# Patient Record
Sex: FEMALE | Race: WHITE | NOT HISPANIC OR LATINO | URBAN - METROPOLITAN AREA
[De-identification: names, ages, dates, MRNs, and addresses within clinical notes are randomized per-mention and may not be internally consistent; named-entity substitution may affect disease eponyms.]

---

## 2020-06-01 ENCOUNTER — OFFICE VISIT (OUTPATIENT)
Dept: URBAN - METROPOLITAN AREA LAB 3 | Facility: LAB | Age: 55
End: 2020-06-01
Payer: COMMERCIAL

## 2020-06-01 DIAGNOSIS — K22.8 COLUMNAR-LINED ESOPHAGUS: ICD-10-CM

## 2020-06-01 DIAGNOSIS — K31.7 BENIGN GASTRIC POLYP: ICD-10-CM

## 2020-06-01 DIAGNOSIS — K29.30 CHRONIC SUPERFICIAL GASTRITIS: ICD-10-CM

## 2020-06-01 PROCEDURE — 43239 EGD BIOPSY SINGLE/MULTIPLE: CPT | Performed by: INTERNAL MEDICINE

## 2020-06-04 ENCOUNTER — LAB OUTSIDE AN ENCOUNTER (OUTPATIENT)
Dept: URBAN - METROPOLITAN AREA CLINIC 22 | Facility: CLINIC | Age: 55
End: 2020-06-04

## 2020-06-04 ENCOUNTER — TELEPHONE ENCOUNTER (OUTPATIENT)
Dept: URBAN - METROPOLITAN AREA CLINIC 22 | Facility: CLINIC | Age: 55
End: 2020-06-04

## 2020-06-26 ENCOUNTER — OFFICE VISIT (OUTPATIENT)
Dept: URBAN - METROPOLITAN AREA TELEHEALTH 2 | Facility: TELEHEALTH | Age: 55
End: 2020-06-26

## 2020-06-26 RX ORDER — NICOTINE POLACRILEX 2 MG
GUM BUCCAL
Qty: 0 | Refills: 0 | COMMUNITY
Start: 1900-01-01

## 2020-06-26 RX ORDER — IBUPROFEN AND FAMOTIDINE 800; 26.6 MG/1; MG/1
TAKE 1 TABLET BY ORAL ROUTE 3 TIMES PER DAY TABLET, COATED ORAL
Qty: 0 | Refills: 0 | COMMUNITY
Start: 1900-01-01

## 2020-06-26 RX ORDER — MAGNESIUM 30 MG
TABLET ORAL
Qty: 0 | Refills: 0 | COMMUNITY
Start: 1900-01-01

## 2020-06-26 RX ORDER — TURMERIC 400 MG
CAPSULE ORAL
Qty: 0 | Refills: 0 | COMMUNITY
Start: 1900-01-01

## 2020-06-26 RX ORDER — METOPROLOL TARTRATE 50 MG/1
TAKE 1 TABLET (50 MG) BY ORAL ROUTE 2 TIMES PER DAY WITH MEALS TABLET, FILM COATED ORAL 2
Qty: 0 | Refills: 0 | COMMUNITY
Start: 1900-01-01

## 2020-06-26 RX ORDER — FAMOTIDINE 40 MG/1
TAKE 1 TABLET BY ORAL ROUTE ONCE DAILY AT BEDTIME TABLET ORAL 1
Qty: 90 | Refills: 3 | COMMUNITY
Start: 2020-05-22 | End: 2021-05-17

## 2020-06-26 RX ORDER — CETIRIZINE HYDROCHLORIDE 10 MG/1
CAPSULE, LIQUID FILLED ORAL
Qty: 0 | Refills: 0 | COMMUNITY
Start: 1900-01-01

## 2020-06-26 RX ORDER — ONDANSETRON 4 MG/1
PLACE 1 TABLET ON TOP OF THE TONGUE WHERE IT WILL DISSOLVE, THEN SWALLOW, AS NEEDED EVERY 4 HOURS FOR NAUSEA/VOMITING TABLET, ORALLY DISINTEGRATING ORAL
Qty: 60 | Refills: 5 | COMMUNITY
Start: 2020-05-26

## 2020-06-26 RX ORDER — ZOLPIDEM TARTRATE 5 MG/1
TAKE 1 TABLET (5 MG) BY ORAL ROUTE ONCE DAILY AT BEDTIME TABLET, FILM COATED ORAL 1
Qty: 0 | Refills: 0 | COMMUNITY
Start: 1900-01-01

## 2020-06-26 RX ORDER — ASCORBIC ACID 500 MG
TABLET ORAL
Qty: 0 | Refills: 0 | COMMUNITY
Start: 1900-01-01

## 2020-06-26 RX ORDER — GLUCOSAMINE HCL/CHONDROITIN SU 500-400 MG
CAPSULE ORAL
Qty: 0 | Refills: 0 | COMMUNITY
Start: 1900-01-01

## 2020-06-26 RX ORDER — PANTOPRAZOLE SODIUM 40 MG/1
TAKE 1 TABLET (40 MG) BY ORAL ROUTE 2 TIMES PER DAY, 30 MINUTES BEFORE BREAKFAST AND DINNER TABLET, DELAYED RELEASE ORAL 1
Qty: 180 | Refills: 3 | COMMUNITY
Start: 2020-05-22 | End: 2021-05-17

## 2020-06-29 ENCOUNTER — TELEPHONE ENCOUNTER (OUTPATIENT)
Dept: URBAN - METROPOLITAN AREA CLINIC 78 | Facility: CLINIC | Age: 55
End: 2020-06-29

## 2020-07-10 ENCOUNTER — OFFICE VISIT (OUTPATIENT)
Dept: URBAN - METROPOLITAN AREA TELEHEALTH 2 | Facility: TELEHEALTH | Age: 55
End: 2020-07-10
Payer: COMMERCIAL

## 2020-07-10 DIAGNOSIS — Z80.0 FAMILY HISTORY OF COLON CANCER: ICD-10-CM

## 2020-07-10 DIAGNOSIS — K44.9 HIATAL HERNIA: ICD-10-CM

## 2020-07-10 DIAGNOSIS — K22.70 BARRETT'S ESOPHAGUS: ICD-10-CM

## 2020-07-10 DIAGNOSIS — K21.0 GASTROESOPHAGEAL REFLUX DISEASE (GERD): ICD-10-CM

## 2020-07-10 DIAGNOSIS — Z86.010 PERSONAL HISTORY OF COLON POLYPS: ICD-10-CM

## 2020-07-10 PROCEDURE — 1036F TOBACCO NON-USER: CPT | Performed by: INTERNAL MEDICINE

## 2020-07-10 PROCEDURE — G8427 DOCREV CUR MEDS BY ELIG CLIN: HCPCS | Performed by: INTERNAL MEDICINE

## 2020-07-10 PROCEDURE — 3017F COLORECTAL CA SCREEN DOC REV: CPT | Performed by: INTERNAL MEDICINE

## 2020-07-10 PROCEDURE — G9903 PT SCRN TBCO ID AS NON USER: HCPCS | Performed by: INTERNAL MEDICINE

## 2020-07-10 PROCEDURE — 99214 OFFICE O/P EST MOD 30 MIN: CPT | Performed by: INTERNAL MEDICINE

## 2020-07-10 PROCEDURE — G8417 CALC BMI ABV UP PARAM F/U: HCPCS | Performed by: INTERNAL MEDICINE

## 2020-07-10 RX ORDER — PANTOPRAZOLE SODIUM 40 MG/1
TAKE 1 TABLET (40 MG) BY ORAL ROUTE 2 TIMES PER DAY, 30 MINUTES BEFORE BREAKFAST AND DINNER TABLET, DELAYED RELEASE ORAL 1
OUTPATIENT
Start: 2020-05-22 | End: 2021-05-17

## 2020-07-10 RX ORDER — PANTOPRAZOLE SODIUM 20 MG/1
1 TABLET, 30 MINUTES BEFORE BREAKFAST TABLET, DELAYED RELEASE ORAL ONCE A DAY
Qty: 90 | Refills: 3 | OUTPATIENT
Start: 2020-07-10

## 2020-07-10 RX ORDER — PANTOPRAZOLE SODIUM 40 MG/1
TAKE 1 TABLET (40 MG) BY ORAL ROUTE 2 TIMES PER DAY, 30 MINUTES BEFORE BREAKFAST AND DINNER TABLET, DELAYED RELEASE ORAL 1
Qty: 180 | Refills: 3 | Status: ACTIVE | COMMUNITY
Start: 2020-05-22 | End: 2021-05-17

## 2020-07-10 RX ORDER — GLUCOSAMINE HCL/CHONDROITIN SU 500-400 MG
CAPSULE ORAL
Qty: 0 | Refills: 0 | Status: ACTIVE | COMMUNITY
Start: 1900-01-01

## 2020-07-10 RX ORDER — FAMOTIDINE 40 MG/1
TAKE 1 TABLET BY ORAL ROUTE ONCE DAILY AT BEDTIME TABLET ORAL 1
Qty: 90 | Refills: 3 | Status: ACTIVE | COMMUNITY
Start: 2020-05-22 | End: 2021-05-17

## 2020-07-10 RX ORDER — IBUPROFEN AND FAMOTIDINE 800; 26.6 MG/1; MG/1
TAKE 1 TABLET BY ORAL ROUTE 3 TIMES PER DAY TABLET, COATED ORAL
Qty: 0 | Refills: 0 | Status: ACTIVE | COMMUNITY
Start: 1900-01-01

## 2020-07-10 RX ORDER — CETIRIZINE HYDROCHLORIDE 10 MG/1
CAPSULE, LIQUID FILLED ORAL
Qty: 0 | Refills: 0 | Status: ACTIVE | COMMUNITY
Start: 1900-01-01

## 2020-07-10 RX ORDER — METOPROLOL TARTRATE 50 MG/1
TAKE 1 TABLET (50 MG) BY ORAL ROUTE 2 TIMES PER DAY WITH MEALS TABLET, FILM COATED ORAL 2
Qty: 0 | Refills: 0 | Status: ACTIVE | COMMUNITY
Start: 1900-01-01

## 2020-07-10 RX ORDER — ZOLPIDEM TARTRATE 5 MG/1
TAKE 1 TABLET (5 MG) BY ORAL ROUTE ONCE DAILY AT BEDTIME TABLET, FILM COATED ORAL 1
Qty: 0 | Refills: 0 | Status: ACTIVE | COMMUNITY
Start: 1900-01-01

## 2020-07-10 RX ORDER — ONDANSETRON 4 MG/1
PLACE 1 TABLET ON TOP OF THE TONGUE WHERE IT WILL DISSOLVE, THEN SWALLOW, AS NEEDED EVERY 4 HOURS FOR NAUSEA/VOMITING TABLET, ORALLY DISINTEGRATING ORAL
Qty: 60 | Refills: 5 | Status: ACTIVE | COMMUNITY
Start: 2020-05-26

## 2020-07-10 RX ORDER — MAGNESIUM 30 MG
TABLET ORAL
Qty: 0 | Refills: 0 | Status: ACTIVE | COMMUNITY
Start: 1900-01-01

## 2020-07-10 RX ORDER — ASCORBIC ACID 500 MG
TABLET ORAL
Qty: 0 | Refills: 0 | Status: ACTIVE | COMMUNITY
Start: 1900-01-01

## 2020-07-10 RX ORDER — TURMERIC 400 MG
CAPSULE ORAL
Qty: 0 | Refills: 0 | Status: ACTIVE | COMMUNITY
Start: 1900-01-01

## 2020-07-10 RX ORDER — NICOTINE POLACRILEX 2 MG
GUM BUCCAL
Qty: 0 | Refills: 0 | Status: ACTIVE | COMMUNITY
Start: 1900-01-01

## 2020-07-10 RX ORDER — FAMOTIDINE 40 MG/1
1 TABLET AT BEDTIME TABLET ORAL ONCE A DAY
Qty: 90 TABLET | Refills: 3
Start: 2020-05-22

## 2020-07-10 NOTE — HPI-OTHER HISTORIES
Previous visit 19:    - 53 yo  female who returns for follow-up - EGD on 19 was notable for possible short segment Finch's esophagus (irregular Z line with intestinal metaplasia), a 3 cm hiatal hernia, and mild gastritis, H. pylori negative.  The esophagus was normal but random esophageal biopsies were suggestive of mild acid reflux changes.  Random gastric and duodenal biopsies were negative.  Patient did have a small submucosal nodule in the stomach, for which further evaluation with EUS is planned in the near future. - Since her EGD, the patient had increased her pantoprazole to 40 mg qAM but she is still experiencing acid reflux symptoms at night - States she recently had a negative stress test with her cardiologist  Previous visit 19:  - 52 yo  female who returns for follow-up - Abdominopelvic CT done on 19 was negative for diverticulitis.  It was notable for mild distal esophageal wall thickening, small non-obstructing left renal calculi, and uterine fibroids - Still with same GI symptoms.  She does note that she is under a lot of stress, mostly related to a daughter with metal illness.  She is scheduled for an EGD with me next month. - Has been experiencing intermittent rectal bleeding for the past 2 weeks.  Has constipation alternating with diarrhea  Previous visit 19:  - 52 yo  female, referred by Dr. Randolph for further evaluation of GI complaints as detailed below, which have been ongoing for approximately 1 week - Epigastric abdominal pain with radiation to her back.  Pain varies between dull and sharp.  She also has lower abdominal cramping which is worse with bowel movements. - States she has not had a bowel movement for a few days.  Before then she would have 2-3 hard bowel movements per day.  She is able to pass gas. - Additional associated symptoms have included nausea, vomiting, abdominal bloating, and intermittent heartburn symptoms.  Denies fevers. - States she saw her gynecologist yesterday and that they were trying to get a cervical biopsy but that the cervix would not relax because she is post-menopausal, so they could not get it done - States she had another attack of pain last night so she went to an urgent care center.  States she had bloodwork (does not know results) and she was discharged home.  She was told to take Duexis (ibuprofen and famotidine) for her pain. - States she has a h/o colon polyps in  and that she subsequently had a negative colonoscopy with Dr. Mccoy in .  States a repeat colonoscopy in  was recommended. - Father  of colon cancer at age 80

## 2020-07-10 NOTE — EXAM-PHYSICAL EXAM
VIRTUAL PHYSICAL EXAM    Comprehensive gastrointestinal virtual examination:  I asked the patient to "be my hands".  I guided the patient to palpate and apply pressure at all four abdominal quadrants.  In response to these maneuvers, patient reported the following:  - Negative response to abdominal pain - Abdomen is soft, non-tender, and non-distended - No palpable abdominal masses - No palpable organs in the upper abdomen (no hepatomegaly or splenomegaly)   Constitutional - Ability to communicate:  Normal communication ability   Chest - Respiratory effort:  Breathing sounds unlabored   Cardiovascular - Palpation - Denies chest pain with palpation   Neurologic - Orientation:  Oriented to person, place, and time   Psychiatric:  Normal mood

## 2020-07-10 NOTE — HPI-TODAY'S VISIT:
- She is now doing well on pantoprazole 40 mg bid and famotidine 40 mg qhs  - EGD on 6/1/2020 was again suggestive of short segment Finch's esophagus without dysplasia (irregular Z-line with intestinal metaplasia) and a medium-sized hiatal hernia.  Random esophageal and gastric biopsies were benign and negative for any significant pathology. - EUS in 11/2019 revealed that her previously seen gastric submucosal lesions was a small submucosal gastric lipoma - Patient is scheduled for colonoscopy with me next month, for surveillance of colon polyps

## 2020-07-29 ENCOUNTER — DASHBOARD ENCOUNTERS (OUTPATIENT)
Age: 55
End: 2020-07-29

## 2020-07-30 ENCOUNTER — OFFICE VISIT (OUTPATIENT)
Dept: URBAN - METROPOLITAN AREA SURGERY CENTER 14 | Facility: SURGERY CENTER | Age: 55
End: 2020-07-30

## 2020-07-30 ENCOUNTER — OFFICE VISIT (OUTPATIENT)
Dept: URBAN - METROPOLITAN AREA SURGERY CENTER 14 | Facility: SURGERY CENTER | Age: 55
End: 2020-07-30
Payer: COMMERCIAL

## 2020-07-30 DIAGNOSIS — Z86.010 H/O ADENOMATOUS POLYP OF COLON: ICD-10-CM

## 2020-07-30 DIAGNOSIS — D12.0 BENIGN NEOPLASM OF CECUM: ICD-10-CM

## 2020-07-30 DIAGNOSIS — Z80.0 FAMILY HISTORY MALIGNANT NEOPLASM OF BILIARY TRACT: ICD-10-CM

## 2020-07-30 PROCEDURE — 45385 COLONOSCOPY W/LESION REMOVAL: CPT | Performed by: INTERNAL MEDICINE

## 2020-07-30 PROCEDURE — G8907 PT DOC NO EVENTS ON DISCHARG: HCPCS | Performed by: INTERNAL MEDICINE

## 2020-07-30 PROCEDURE — G9936 PMH PLYP/NEO CO/RECT/JUN/ANS: HCPCS | Performed by: INTERNAL MEDICINE

## 2020-07-30 RX ORDER — NICOTINE POLACRILEX 2 MG
GUM BUCCAL
Qty: 0 | Refills: 0 | Status: ACTIVE | COMMUNITY
Start: 1900-01-01

## 2020-07-30 RX ORDER — PANTOPRAZOLE SODIUM 20 MG/1
1 TABLET, 30 MINUTES BEFORE BREAKFAST TABLET, DELAYED RELEASE ORAL ONCE A DAY
Qty: 90 | Refills: 3 | Status: ACTIVE | COMMUNITY
Start: 2020-07-10

## 2020-07-30 RX ORDER — CETIRIZINE HYDROCHLORIDE 10 MG/1
CAPSULE, LIQUID FILLED ORAL
Qty: 0 | Refills: 0 | Status: ACTIVE | COMMUNITY
Start: 1900-01-01

## 2020-07-30 RX ORDER — TURMERIC 400 MG
CAPSULE ORAL
Qty: 0 | Refills: 0 | Status: ACTIVE | COMMUNITY
Start: 1900-01-01

## 2020-07-30 RX ORDER — METOPROLOL TARTRATE 50 MG/1
TAKE 1 TABLET (50 MG) BY ORAL ROUTE 2 TIMES PER DAY WITH MEALS TABLET, FILM COATED ORAL 2
Qty: 0 | Refills: 0 | Status: ACTIVE | COMMUNITY
Start: 1900-01-01

## 2020-07-30 RX ORDER — GLUCOSAMINE HCL/CHONDROITIN SU 500-400 MG
CAPSULE ORAL
Qty: 0 | Refills: 0 | Status: ACTIVE | COMMUNITY
Start: 1900-01-01

## 2020-07-30 RX ORDER — ASCORBIC ACID 500 MG
TABLET ORAL
Qty: 0 | Refills: 0 | Status: ACTIVE | COMMUNITY
Start: 1900-01-01

## 2020-07-30 RX ORDER — MAGNESIUM 30 MG
TABLET ORAL
Qty: 0 | Refills: 0 | Status: ACTIVE | COMMUNITY
Start: 1900-01-01

## 2020-07-30 RX ORDER — ZOLPIDEM TARTRATE 5 MG/1
TAKE 1 TABLET (5 MG) BY ORAL ROUTE ONCE DAILY AT BEDTIME TABLET, FILM COATED ORAL 1
Qty: 0 | Refills: 0 | Status: ACTIVE | COMMUNITY
Start: 1900-01-01

## 2020-07-30 RX ORDER — IBUPROFEN AND FAMOTIDINE 800; 26.6 MG/1; MG/1
TAKE 1 TABLET BY ORAL ROUTE 3 TIMES PER DAY TABLET, COATED ORAL
Qty: 0 | Refills: 0 | Status: ACTIVE | COMMUNITY
Start: 1900-01-01

## 2020-07-30 RX ORDER — ONDANSETRON 4 MG/1
PLACE 1 TABLET ON TOP OF THE TONGUE WHERE IT WILL DISSOLVE, THEN SWALLOW, AS NEEDED EVERY 4 HOURS FOR NAUSEA/VOMITING TABLET, ORALLY DISINTEGRATING ORAL
Qty: 60 | Refills: 5 | Status: ACTIVE | COMMUNITY
Start: 2020-05-26

## 2020-07-30 RX ORDER — FAMOTIDINE 40 MG/1
1 TABLET AT BEDTIME TABLET ORAL ONCE A DAY
Qty: 90 TABLET | Refills: 3 | Status: ACTIVE | COMMUNITY
Start: 2020-05-22